# Patient Record
(demographics unavailable — no encounter records)

---

## 2025-04-28 NOTE — REASON FOR VISIT
[Initial Visit] : an initial visit for [FreeTextEntry2] : left 4th digit fracture, and heel pain left

## 2025-04-28 NOTE — HISTORY OF PRESENT ILLNESS
[FreeTextEntry1] : Presents in the [place] office for fracture left 4th digit . States in November 2024 when she was walking and stubbed her toe. Patient continues to have discomfort when ambulating certain shoes with narrow toe box. She describes pain has discomfort, pain level 5 out of 10.  Patient had x rays 2 weeks after, prescribed by [facility].  Complains of heel pain sometimes but not constant and is tolerable. Patient states she is taking medication due to veins.

## 2025-04-28 NOTE — ASSESSMENT
[FreeTextEntry1] : Discussed diagnosis and treatment with patient Discussed etiology of symptoms patient is experiencing Obtained/reviewed left foot xrays 3 views WB 4/28/25: healed fracture 4th proximal phalanx. No other fractures noted. Plantar calcaneal enthesopathy noted Demonstrated proper stretching techniques with patient showing understanding Discussed proper RICE techniques to reduce inflammation and for pain control Discussed specific examples of over-the-counter inserts to control heel eversion and support the medial arch Discussed proper shoes (stiff heel counter and midsole with flexion at the 1st MTPJ) Rx: Meloxicam PO BID for 2 weeks, then as needed Recommended to continue Vitamin D supplements Discussed all risks, complications, and benefits of corticosteroid injection therapy.  Will have further discussion at next visit  Patient to return to the office in 4 weeks or PRN

## 2025-04-28 NOTE — PHYSICAL EXAM
[General Appearance - Alert] : alert [General Appearance - In No Acute Distress] : in no acute distress [Ankle Swelling (On Exam)] : present [Ankle Swelling On The Left] : of the left ankle [Varicose Veins Of Lower Extremities] : bilaterally [Ankle Swelling On The Right] : mild [Delayed in the Left Toes] : capillary refills normal in the left toes [de-identified] : mild tolerable tenderness to palpation left 4th toe Tolerable pain on palpation at medial calcaneal tuberosity of Left.  Increased pain with the windless mechanism.  Flexible mid/hindfoot deformity noted foot passively corrected to plantigrade foot position Right Left.  Decreased range of motion in dorsiflexion Right Left. Standing Exam: Decrease in medial longitudinal arch Right and Left. Hindfoot valgus noted Right and Left.  [Skin Color & Pigmentation] : normal skin color and pigmentation [Skin Turgor] : normal skin turgor [] : no rash [Skin Lesions] : no skin lesions [Foot Ulcer] : no foot ulcer [Skin Induration] : no skin induration [Sensation] : the sensory exam was normal to light touch and pinprick [No Focal Deficits] : no focal deficits [Deep Tendon Reflexes (DTR)] : deep tendon reflexes were 2+ and symmetric [Motor Exam] : the motor exam was normal [Oriented To Time, Place, And Person] : oriented to person, place, and time [Impaired Insight] : insight and judgment were intact [Affect] : the affect was normal

## 2025-05-29 NOTE — ASSESSMENT
[FreeTextEntry1] : Discussed diagnosis and treatment with patient Discussed etiology of symptoms patient is experiencing Reviewed left foot xrays 3 views WB 4/28/25: healed fracture 4th proximal phalanx. No other fractures noted. Plantar calcaneal enthesopathy noted Demonstrated proper stretching techniques with patient showing understanding Discussed proper RICE techniques to reduce inflammation and for pain control Discussed specific examples of over-the-counter inserts to control heel eversion and support the medial arch Discussed proper shoes (stiff heel counter and midsole with flexion at the 1st MTPJ) Rx: Meloxicam PO BID for 2 weeks, then as needed Recommended to continue Vitamin D supplements Given PT script f/u vascular for varicose veins Patient to return to the office in 1 month

## 2025-05-29 NOTE — HISTORY OF PRESENT ILLNESS
[FreeTextEntry1] : Presents in the [place] office for fracture left 4th digit and b/l feet pain. Believes left 4th digit fracture is better but is not sure.  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 Admits to stretching once a day. Had varicose vein, injection treatment last year but has not follow up.

## 2025-05-29 NOTE — PHYSICAL EXAM
[General Appearance - Alert] : alert [General Appearance - In No Acute Distress] : in no acute distress [Varicose Veins Of Lower Extremities] : bilaterally [Ankle Swelling On The Right] : mild [Skin Color & Pigmentation] : normal skin color and pigmentation [Skin Turgor] : normal skin turgor [] : no rash [Skin Lesions] : no skin lesions [Sensation] : the sensory exam was normal to light touch and pinprick [No Focal Deficits] : no focal deficits [Deep Tendon Reflexes (DTR)] : deep tendon reflexes were 2+ and symmetric [Motor Exam] : the motor exam was normal [Oriented To Time, Place, And Person] : oriented to person, place, and time [Impaired Insight] : insight and judgment were intact [Affect] : the affect was normal [Delayed in the Left Toes] : capillary refills normal in the left toes [Pes Cavus] : pes cavus deformity [de-identified] : Tolerable pain on palpation at left posterior ankle along Achilles, medial calcaneal tuberosity of Left.  Increased pain with the windless mechanism.  Flexible mid/hindfoot deformity noted foot passively corrected to plantigrade foot position Right Left.  Decreased range of motion in dorsiflexion Right Left. Standing Exam: Decrease in medial longitudinal arch Right and Left. Hindfoot valgus noted Right and Left.  [Foot Ulcer] : no foot ulcer [Skin Induration] : no skin induration [FreeTextEntry1] : diffuse varicosities B/L LE

## 2025-06-13 NOTE — HISTORY OF PRESENT ILLNESS
[FreeTextEntry1] : 67-year-old female with PMH HTN presents with referral from podiatry for bilateral varicose veins. Patient reports she had previous treatment of varicose veins including injections performed at a hospital in [place] with last procedure being in November. She reports that injections helped but she continues to have pain over her veins and swelling in both lower extremities that worsens by end of day. She reports she has tried compression stockings, but they are uncomfortable and painful.  Denies history of DVT or SVT or trauma. Denies claudication, rest pain, ulcers, chest pain, SOB, dyspnea on exertion, or orthopnea.

## 2025-06-13 NOTE — PHYSICAL EXAM
[Normal Rate and Rhythm] : normal rate and rhythm [2+] : left 2+ [Alert] : alert [Oriented to Person] : oriented to person [Oriented to Place] : oriented to place [Oriented to Time] : oriented to time [Calm] : calm [Skin Ulcer] : no ulcer [de-identified] : Appears well, in no acute distress.  [de-identified] : normocephalic, atraumatic  [de-identified] :   normal respiratory effort. unlabored breathing.  [de-identified] : Bilateral spider veins

## 2025-06-13 NOTE — ASSESSMENT
[Foot care/Footwear] : foot care/footwear [FreeTextEntry1] : 67-year-old female with painful spider veins. Venous duplex performed in office today demonstrates closed left GSV from the SFJ to the distal thigh, and no evidence of venous insufficiency, DVT or SVT bilaterally. Patient had previous vein procedures including sclerotherapy which she reports were helpful. She reports she was not able to finish her treatments as she relocated from [place].  Plan Duplex findings discussed with patient. Recommend bilateral sclerotherapy for symptomatic small varicose veins given that they are painful and bothersome. Risks and benefits discussed. Will schedule procedure per patients' availability pending insurance approval. Recommended compression stockings daily however patient reports they make her pain worse. Recommend continued leg elevation for symptomatic relief.

## 2025-07-23 NOTE — PHYSICAL EXAM
[General Appearance - Alert] : alert [General Appearance - In No Acute Distress] : in no acute distress [Varicose Veins Of Lower Extremities] : bilaterally [Ankle Swelling On The Right] : mild [Pes Cavus] : pes cavus deformity [Skin Color & Pigmentation] : normal skin color and pigmentation [Skin Turgor] : normal skin turgor [] : no rash [Skin Lesions] : no skin lesions [Sensation] : the sensory exam was normal to light touch and pinprick [No Focal Deficits] : no focal deficits [Deep Tendon Reflexes (DTR)] : deep tendon reflexes were 2+ and symmetric [Motor Exam] : the motor exam was normal [Oriented To Time, Place, And Person] : oriented to person, place, and time [Impaired Insight] : insight and judgment were intact [Affect] : the affect was normal [Delayed in the Left Toes] : capillary refills normal in the left toes [de-identified] : Tolerable pain on palpation at left posterior ankle along Achilles, medial calcaneal tuberosity of Left.  Increased pain with the windless mechanism.  Flexible mid/hindfoot deformity noted foot passively corrected to plantigrade foot position Right Left.  Decreased range of motion in dorsiflexion Right Left. Standing Exam: Decrease in medial longitudinal arch Right and Left. Hindfoot valgus noted Right and Left.  [Foot Ulcer] : no foot ulcer [Skin Induration] : no skin induration [FreeTextEntry1] : diffuse varicosities B/L LE

## 2025-07-23 NOTE — ASSESSMENT
[Verbal] : verbal [Patient] : patient [Good - alert, interested, motivated] : Good - alert, interested, motivated [Verbalizes knowledge/Understanding] : Verbalizes knowledge/understanding [FreeTextEntry1] : Discussed diagnosis and treatment with patient Discussed etiology of symptoms patient is experiencing Reviewed left foot xrays 3 views WB 4/28/25: healed fracture 4th proximal phalanx. No other fractures noted. Plantar calcaneal enthesopathy noted Demonstrated proper stretching techniques with patient showing understanding Discussed proper RICE techniques to reduce inflammation and for pain control Discussed specific examples of over-the-counter inserts to control heel eversion and support the medial arch Discussed proper shoes (stiff heel counter and midsole with flexion at the 1st MTPJ) Rx: Meloxicam PO BID for 2 weeks, then as needed Recommended to continue Vitamin D supplements Given PT script prior. Patient willing to resume home stretching exercises due to need for PT for cervical pain being managed by neurologist f/u vascular for varicose veins Patient to call for reappointment upon worsening symptoms to consider injection [Pt responsibility to plan of care] : patient responsibility to plan of care